# Patient Record
Sex: MALE | Race: BLACK OR AFRICAN AMERICAN | NOT HISPANIC OR LATINO | URBAN - METROPOLITAN AREA
[De-identification: names, ages, dates, MRNs, and addresses within clinical notes are randomized per-mention and may not be internally consistent; named-entity substitution may affect disease eponyms.]

---

## 2017-10-19 PROBLEM — Z00.00 ENCOUNTER FOR PREVENTIVE HEALTH EXAMINATION: Status: ACTIVE | Noted: 2017-10-19

## 2017-10-25 ENCOUNTER — OUTPATIENT (OUTPATIENT)
Dept: OUTPATIENT SERVICES | Facility: HOSPITAL | Age: 62
LOS: 1 days | End: 2017-10-25
Payer: MEDICAID

## 2017-10-25 ENCOUNTER — APPOINTMENT (OUTPATIENT)
Dept: MRI IMAGING | Facility: CLINIC | Age: 62
End: 2017-10-25
Payer: MEDICAID

## 2017-10-25 DIAGNOSIS — Z00.8 ENCOUNTER FOR OTHER GENERAL EXAMINATION: ICD-10-CM

## 2017-10-25 PROCEDURE — 72197 MRI PELVIS W/O & W/DYE: CPT

## 2017-10-25 PROCEDURE — 72197 MRI PELVIS W/O & W/DYE: CPT | Mod: 26

## 2017-10-25 PROCEDURE — 82565 ASSAY OF CREATININE: CPT

## 2017-10-25 PROCEDURE — A9585: CPT

## 2019-04-29 ENCOUNTER — APPOINTMENT (OUTPATIENT)
Dept: RADIOLOGY | Facility: IMAGING CENTER | Age: 64
End: 2019-04-29

## 2024-05-12 ENCOUNTER — EMERGENCY (EMERGENCY)
Facility: HOSPITAL | Age: 69
LOS: 0 days | Discharge: HOME | End: 2024-05-12
Attending: STUDENT IN AN ORGANIZED HEALTH CARE EDUCATION/TRAINING PROGRAM
Payer: MEDICARE

## 2024-05-12 VITALS
OXYGEN SATURATION: 99 % | RESPIRATION RATE: 18 BRPM | SYSTOLIC BLOOD PRESSURE: 181 MMHG | DIASTOLIC BLOOD PRESSURE: 98 MMHG | TEMPERATURE: 98 F | WEIGHT: 179.9 LBS | HEART RATE: 55 BPM

## 2024-05-12 VITALS
OXYGEN SATURATION: 97 % | HEART RATE: 78 BPM | SYSTOLIC BLOOD PRESSURE: 149 MMHG | DIASTOLIC BLOOD PRESSURE: 87 MMHG | TEMPERATURE: 98 F | RESPIRATION RATE: 20 BRPM

## 2024-05-12 DIAGNOSIS — R07.9 CHEST PAIN, UNSPECIFIED: ICD-10-CM

## 2024-05-12 DIAGNOSIS — R00.1 BRADYCARDIA, UNSPECIFIED: ICD-10-CM

## 2024-05-12 LAB
ALBUMIN SERPL ELPH-MCNC: 3.5 G/DL — SIGNIFICANT CHANGE UP (ref 3.3–5)
ALP SERPL-CCNC: 57 U/L — SIGNIFICANT CHANGE UP (ref 40–120)
ALT FLD-CCNC: 38 U/L — SIGNIFICANT CHANGE UP (ref 12–78)
ANION GAP SERPL CALC-SCNC: 5 MMOL/L — SIGNIFICANT CHANGE UP (ref 5–17)
APTT BLD: 33 SEC — SIGNIFICANT CHANGE UP (ref 24.5–35.6)
AST SERPL-CCNC: 24 U/L — SIGNIFICANT CHANGE UP (ref 15–37)
BASOPHILS # BLD AUTO: 0.04 K/UL — SIGNIFICANT CHANGE UP (ref 0–0.2)
BASOPHILS NFR BLD AUTO: 0.8 % — SIGNIFICANT CHANGE UP (ref 0–2)
BILIRUB SERPL-MCNC: 0.9 MG/DL — SIGNIFICANT CHANGE UP (ref 0.2–1.2)
BUN SERPL-MCNC: 11 MG/DL — SIGNIFICANT CHANGE UP (ref 7–23)
CALCIUM SERPL-MCNC: 9 MG/DL — SIGNIFICANT CHANGE UP (ref 8.5–10.1)
CHLORIDE SERPL-SCNC: 107 MMOL/L — SIGNIFICANT CHANGE UP (ref 96–108)
CK MB BLD-MCNC: 1.1 % — SIGNIFICANT CHANGE UP (ref 0–3.5)
CK MB CFR SERPL CALC: 2.1 NG/ML — SIGNIFICANT CHANGE UP (ref 0.5–3.6)
CK SERPL-CCNC: 187 U/L — SIGNIFICANT CHANGE UP (ref 26–308)
CO2 SERPL-SCNC: 25 MMOL/L — SIGNIFICANT CHANGE UP (ref 22–31)
CREAT SERPL-MCNC: 0.86 MG/DL — SIGNIFICANT CHANGE UP (ref 0.5–1.3)
EGFR: 93 ML/MIN/1.73M2 — SIGNIFICANT CHANGE UP
EOSINOPHIL # BLD AUTO: 0.05 K/UL — SIGNIFICANT CHANGE UP (ref 0–0.5)
EOSINOPHIL NFR BLD AUTO: 1 % — SIGNIFICANT CHANGE UP (ref 0–6)
GLUCOSE SERPL-MCNC: 94 MG/DL — SIGNIFICANT CHANGE UP (ref 70–99)
HCT VFR BLD CALC: 41.5 % — SIGNIFICANT CHANGE UP (ref 39–50)
HGB BLD-MCNC: 14.3 G/DL — SIGNIFICANT CHANGE UP (ref 13–17)
HIV 1 & 2 AB SERPL IA.RAPID: SIGNIFICANT CHANGE UP
IMM GRANULOCYTES NFR BLD AUTO: 0.2 % — SIGNIFICANT CHANGE UP (ref 0–0.9)
INR BLD: 1.01 RATIO — SIGNIFICANT CHANGE UP (ref 0.85–1.18)
LYMPHOCYTES # BLD AUTO: 1.04 K/UL — SIGNIFICANT CHANGE UP (ref 1–3.3)
LYMPHOCYTES # BLD AUTO: 20.4 % — SIGNIFICANT CHANGE UP (ref 13–44)
MCHC RBC-ENTMCNC: 33.1 PG — SIGNIFICANT CHANGE UP (ref 27–34)
MCHC RBC-ENTMCNC: 34.5 G/DL — SIGNIFICANT CHANGE UP (ref 32–36)
MCV RBC AUTO: 96.1 FL — SIGNIFICANT CHANGE UP (ref 80–100)
MONOCYTES # BLD AUTO: 0.39 K/UL — SIGNIFICANT CHANGE UP (ref 0–0.9)
MONOCYTES NFR BLD AUTO: 7.6 % — SIGNIFICANT CHANGE UP (ref 2–14)
NEUTROPHILS # BLD AUTO: 3.58 K/UL — SIGNIFICANT CHANGE UP (ref 1.8–7.4)
NEUTROPHILS NFR BLD AUTO: 70 % — SIGNIFICANT CHANGE UP (ref 43–77)
NRBC # BLD: 0 /100 WBCS — SIGNIFICANT CHANGE UP (ref 0–0)
PLATELET # BLD AUTO: 195 K/UL — SIGNIFICANT CHANGE UP (ref 150–400)
POTASSIUM SERPL-MCNC: 3.7 MMOL/L — SIGNIFICANT CHANGE UP (ref 3.5–5.3)
POTASSIUM SERPL-SCNC: 3.7 MMOL/L — SIGNIFICANT CHANGE UP (ref 3.5–5.3)
PROT SERPL-MCNC: 7.3 GM/DL — SIGNIFICANT CHANGE UP (ref 6–8.3)
PROTHROM AB SERPL-ACNC: 12 SEC — SIGNIFICANT CHANGE UP (ref 9.5–13)
RBC # BLD: 4.32 M/UL — SIGNIFICANT CHANGE UP (ref 4.2–5.8)
RBC # FLD: 12 % — SIGNIFICANT CHANGE UP (ref 10.3–14.5)
SODIUM SERPL-SCNC: 137 MMOL/L — SIGNIFICANT CHANGE UP (ref 135–145)
TROPONIN I, HIGH SENSITIVITY RESULT: 8.1 NG/L — SIGNIFICANT CHANGE UP
WBC # BLD: 5.11 K/UL — SIGNIFICANT CHANGE UP (ref 3.8–10.5)
WBC # FLD AUTO: 5.11 K/UL — SIGNIFICANT CHANGE UP (ref 3.8–10.5)

## 2024-05-12 PROCEDURE — 71275 CT ANGIOGRAPHY CHEST: CPT | Mod: 26,MC

## 2024-05-12 PROCEDURE — 93010 ELECTROCARDIOGRAM REPORT: CPT

## 2024-05-12 PROCEDURE — 99285 EMERGENCY DEPT VISIT HI MDM: CPT

## 2024-05-12 PROCEDURE — 71045 X-RAY EXAM CHEST 1 VIEW: CPT | Mod: 26

## 2024-05-12 RX ORDER — ACETAMINOPHEN 500 MG
1000 TABLET ORAL ONCE
Refills: 0 | Status: COMPLETED | OUTPATIENT
Start: 2024-05-12 | End: 2024-05-12

## 2024-05-12 RX ORDER — ASPIRIN/CALCIUM CARB/MAGNESIUM 324 MG
325 TABLET ORAL ONCE
Refills: 0 | Status: COMPLETED | OUTPATIENT
Start: 2024-05-12 | End: 2024-05-12

## 2024-05-12 RX ORDER — METHOCARBAMOL 500 MG/1
1000 TABLET, FILM COATED ORAL ONCE
Refills: 0 | Status: COMPLETED | OUTPATIENT
Start: 2024-05-12 | End: 2024-05-12

## 2024-05-12 RX ORDER — TAMSULOSIN HYDROCHLORIDE 0.4 MG/1
1 CAPSULE ORAL
Refills: 0 | DISCHARGE

## 2024-05-12 RX ADMIN — METHOCARBAMOL 1000 MILLIGRAM(S): 500 TABLET, FILM COATED ORAL at 21:03

## 2024-05-12 RX ADMIN — Medication 400 MILLIGRAM(S): at 20:12

## 2024-05-12 RX ADMIN — Medication 325 MILLIGRAM(S): at 20:12

## 2024-05-12 NOTE — ED PROVIDER NOTE - CLINICAL SUMMARY MEDICAL DECISION MAKING FREE TEXT BOX
69-year-old male with history of prostate cancer presents today complaining of left-sided nonradiating chest pain for the last 3 days.  Patient describes discomfort with intermittent pain rated 5 out of 10, at bedside feels his pain increasing to 6 out of 10.  Denies symptoms of shortness of breath, nausea or vomiting, dizziness or lightheadedness, diaphoresis, nausea/vomiting, palpitations, leg edema, flulike symptoms, fever or chills. patient does state that he travels every 6 weeks to [place] where he works, but also notes that his pain started after drinking a herbal tea that was sitting for 1 week.  On exam patient is awake alert oriented x 3 well-appearing, nontoxic/non-lethargic appearing in no respiratory distress.  Patient noted to be rubbing the left chest wall.  His exam is as noted in the physical exam.  Differential diagnosis includes not limited to AMI, pneumonia, PE, viral illness, costochondritis.  Labs ordered and chest x-ray.  Based on level of D-dimer CT angio will also be ordered.  Will reassess and dispo 69-year-old male with history of prostate cancer presents today complaining of left-sided nonradiating chest pain for the last 3 days.  Patient describes discomfort with intermittent pain rated 5 out of 10, at bedside feels his pain increasing to 6 out of 10.  Denies symptoms of shortness of breath, nausea or vomiting, dizziness or lightheadedness, diaphoresis, nausea/vomiting, palpitations, leg edema, flulike symptoms, fever or chills. patient does state that he travels every 6 weeks to [place] where he works, but also notes that his pain started after drinking a herbal tea that was sitting for 1 week.  On exam patient is awake alert oriented x 3 well-appearing, nontoxic/non-lethargic appearing in no respiratory distress.  Patient noted to be rubbing the left chest wall.  His exam is as noted in the physical exam.  Differential diagnosis includes not limited to AMI, pneumonia, PE, viral illness, costochondritis.  Labs ordered and chest x-ray.  Based on level of D-dimer CT angio will also be ordered.  Will reassess and dispo    labs reviewed, one set of CE neg, due to length of symptoms second set not incdicated, pt frequently travels, ct angio done and neg for pe  pt medicated for his pain, heart score is 2, pt will be referred to cardiology outpatient for follow up care

## 2024-05-12 NOTE — ED PROVIDER NOTE - PATIENT PORTAL LINK FT
You can access the FollowMyHealth Patient Portal offered by [facility] by registering at the following website: [email or web address]. By joining [facility]’s FollowMyHealth portal, you will also be able to view your health information using other applications (apps) compatible with our system.

## 2024-05-12 NOTE — ED ADULT NURSE NOTE - OBJECTIVE STATEMENT
patient came here for chest pain x 2 days, denies sob, denies n/v/dizziness/blurry vision/headache/fever/chills. patient describe as discomfort

## 2024-05-13 LAB
HCV AB S/CO SERPL IA: 0.11 S/CO — SIGNIFICANT CHANGE UP (ref 0–0.99)
HCV AB SERPL-IMP: SIGNIFICANT CHANGE UP

## 2025-07-01 NOTE — ED ADULT NURSE NOTE - NSFALLLASTSIX_ED_ALL_ED
Hospitalist Progress Note      PCP: Kristopher Toribio MD    Date of Admission: 6/30/2025    Chief Complaint: Hip pain, unable to walk    Hospital Course:  89 year old male with a past medical history of Parkinson disease, hypertension, chronic systolic CHF, and aortic valve replacement who presented to the ED with complaint of right hip pain. Patient reportedly fell on 6/4 with associated left hip pain. He was evaluated by orthopedic surgery on 6/27 and ordered an MRI for further evaluation of pelvic ring or sacral fracture which has not yet been done. He was started on a Medrol Dosepak and has been taking oxycodone and tylenol without any relief. He has since developed right hip pain without any new trauma. He is unable to bear weight or ambulate even with the assistance of a walker. Patient was hemodynamically stable on arrival. Basic lab work including CBC and BMP unremarkable. INR was noted to be supratherapeutic at 7.23. CT of the right hip consistent with mild bilateral hip osteoarthritis and advanced degeration of the spine, otherwise no acute fracture or dislocation. Patient was admitted for further evaluation and management in consultation with orthopedic surgery.    Subjective: Patient seen resting in recliner. Complaining of persistent hip pain, both sides. Denies any new falls since the one earlier in June. Made worse with any movement. States he was previously able to walk without any assistance but now is having trouble even walking with a walker. States wife is home with him but also in bad shape.    Assessment/Plan:    Mechanical fall   Bilateral hip pain  Bilateral hip osteoarthritis   Degenerative disc disease   Ambulatory dysfunction  -Pain not improved with oxycodone, tylenol or steroids  -CT scan of right hip shows mild bilateral hip osteoarthritis and advanced degeneration of the spine  -Orthopedic surgery consulted and recommended MRI, if negative, pain likely related to DDD and may  No.